# Patient Record
Sex: MALE | Race: WHITE | Employment: FULL TIME | URBAN - METROPOLITAN AREA
[De-identification: names, ages, dates, MRNs, and addresses within clinical notes are randomized per-mention and may not be internally consistent; named-entity substitution may affect disease eponyms.]

---

## 2019-01-09 PROBLEM — Z85.46 HISTORY OF PROSTATE CANCER: Status: ACTIVE | Noted: 2019-01-09

## 2019-01-09 PROBLEM — N52.8 OTHER MALE ERECTILE DYSFUNCTION: Status: ACTIVE | Noted: 2019-01-09

## 2022-08-15 ENCOUNTER — HOSPITAL ENCOUNTER (EMERGENCY)
Facility: CLINIC | Age: 72
Discharge: HOME | End: 2022-08-15
Attending: EMERGENCY MEDICINE
Payer: COMMERCIAL

## 2022-08-15 VITALS
HEIGHT: 72 IN | HEART RATE: 100 BPM | TEMPERATURE: 98.1 F | BODY MASS INDEX: 28.44 KG/M2 | OXYGEN SATURATION: 93 % | WEIGHT: 210 LBS | DIASTOLIC BLOOD PRESSURE: 77 MMHG | RESPIRATION RATE: 16 BRPM | SYSTOLIC BLOOD PRESSURE: 159 MMHG

## 2022-08-15 DIAGNOSIS — R04.0 EPISTAXIS: Primary | ICD-10-CM

## 2022-08-15 PROCEDURE — 99283 EMERGENCY DEPT VISIT LOW MDM: CPT

## 2022-08-15 RX ORDER — LISINOPRIL 10 MG/1
TABLET ORAL
COMMUNITY
Start: 2022-07-29

## 2022-08-15 RX ORDER — ASPIRIN 81 MG/1
TABLET, CHEWABLE ORAL
COMMUNITY
Start: 2022-07-29

## 2022-08-15 RX ORDER — AMIODARONE HYDROCHLORIDE 100 MG/1
200 TABLET ORAL
COMMUNITY
Start: 2022-07-29

## 2022-08-15 RX ORDER — ALLOPURINOL 100 MG/1
300 TABLET ORAL
COMMUNITY
Start: 2022-07-29

## 2022-08-15 RX ORDER — SIMVASTATIN 40 MG
TABLET ORAL
COMMUNITY
Start: 2022-07-29

## 2022-08-15 RX ORDER — OXYBUTYNIN CHLORIDE 5 MG/1
TABLET ORAL
COMMUNITY
Start: 2022-08-03

## 2022-08-15 RX ORDER — FUROSEMIDE 40 MG/1
TABLET ORAL
COMMUNITY
Start: 2022-07-29

## 2022-08-15 ASSESSMENT — ENCOUNTER SYMPTOMS
ALLERGIC/IMMUNOLOGIC NEGATIVE: 1
EYES NEGATIVE: 1
GASTROINTESTINAL NEGATIVE: 1
RESPIRATORY NEGATIVE: 1

## 2022-08-16 NOTE — DISCHARGE INSTRUCTIONS
Avoid blowing your nose when he sneezes anything open mouth, see your own doctor within 2 days for further evaluation. Use the ointment in the morning and at night make sure that you are running a humidifier in her house that you are not living in a dry environment. Return to the ER if you are worsening in any way.

## 2022-08-16 NOTE — ED PROVIDER NOTES
eMERGENCY dEPARTMENT eNCOUnter      Pt Name: James Abraham  MRN: [id number]  [place] 1950  Date of evaluation: 8/15/2022      CHIEF COMPLAINT       Chief Complaint   Patient presents with    Epistaxis          HISTORY OF PRESENT ILLNESS    James Abraham is a 66 y.o. male who presents urgency department for evaluation of epistaxis that he said was gushing a few minutes before he came to the emergency department however right now it is dry [facility] but [facility] were too busy so he came here. Right now there is no nosebleed. He had the same issue last week and they controlled him with packing and and pressure. Patient is mildly hypertensive here today. REVIEW OF SYSTEMS       Review of Systems   Constitutional: Negative. HENT:  Positive for nosebleeds. Eyes: Negative. Respiratory: Negative. Cardiovascular: Negative. Gastrointestinal: Negative. Endocrine: Negative. Musculoskeletal: Negative. Skin: Negative. Allergic/Immunologic: Negative. Neurological: Negative. Hematological: Negative. Psychiatric/Behavioral: Negative. PAST MEDICAL HISTORY    has a past medical history of BPH with obstruction/lower urinary tract symptoms, Caffeine use, ED (erectile dysfunction), Elevated prostate specific antigen (PSA), Hematuria, Hyperlipidemia, Hypertension, Kidney stones, Kidney stones, and Prostate cancer ([facility] [address] SURGICAL HISTORY      has a past surgical history that includes Prostate surgery (01/15/14); Appendectomy; joint replacement (02/2012); Tonsillectomy and adenoidectomy; and Prostate Biopsy. CURRENT MEDICATIONS       Previous Medications    ALLOPURINOL (ZYLOPRIM) 100 MG TABLET    300 mg    ALLOPURINOL (ZYLOPRIM) 300 MG TABLET    TAKE 1 TABLET BY MOUTH ONCE DAILY    ALLOPURINOL (ZYLOPRIM) 300 MG TABLET    allopurinol 300 mg tablet   Take 1 tablet every day by oral route for 90 days.     AMIODARONE (PACERONE) 100 MG TABLET    200 mg    AMLODIPINE (NORVASC) 5 MG TABLET        ASPIRIN 81 MG CHEWABLE TABLET    1 tablet DAILY (route: oral)    CHLORTHALIDONE (HYGROTON) 25 MG TABLET        CHOLECALCIFEROL (VITAMIN D3) 2000 UNITS CAPS    Take by mouth    FUROSEMIDE (LASIX) 40 MG TABLET    2 tablet DAILY (route: oral)    HYDROCHLOROTHIAZIDE (HYDRODIURIL) 12.5 MG TABLET    take 1 tablet by mouth once daily    LISINOPRIL (PRINIVIL;ZESTRIL) 10 MG TABLET    1 tablet DAILY (route: oral)    LISINOPRIL (PRINIVIL;ZESTRIL) 20 MG TABLET        METOPROLOL (TOPROL-XL) 100 MG XL TABLET        OXYBUTYNIN (DITROPAN) 5 MG TABLET    1 tablet 2 TIMES DAILY (route: oral)    SILDENAFIL (REVATIO) 20 MG TABLET    Take 1 tablet by mouth as needed (ED) Take 1-5 tablets 1/2 hour prior to sexual intercourse prn ED    SIMVASTATIN (ZOCOR) 40 MG TABLET        SIMVASTATIN (ZOCOR) 40 MG TABLET    1 tablet DAILY (route: oral)    SPIRONOLACTONE (ALDACTONE) 25 MG TABLET           ALLERGIES     has No Known Allergies. FAMILY HISTORY     has no family status information on file. family history is not on file. SOCIAL HISTORY      reports that he has quit smoking. He has never used smokeless tobacco. He reports current alcohol use of about 1.7 standard drinks per week. He reports that he does not use drugs. PHYSICAL EXAM     INITIAL VITALS:  height is 6' (1.829 m) and weight is 95.3 kg (210 lb). His oral temperature is 98.1 °F (36.7 °C). His blood pressure is 159/77 (abnormal) and his pulse is 100. His respiration is 16 and oxygen saturation is 93%.        Constitutional: Alert, oriented x3, nontoxic, afebrile, answering questions appropriately, acting properly for age, in no acute distress  HEENT: Extraocular muscles intact, mucus membranes moist, TMs clear bilaterally, no posterior pharyngeal erythema or exudates, Pupils equal, round, reactive to light,   Neck: Trachea midline, Supple without lymphadenopathy, no posterior midline neck tenderness to palpation  Cardiovascular: Regular rhythm and rate no S3, S4, or murmurs  Respiratory: Clear to auscultation bilaterally no wheezes, rhonchi, rales, no respiratory distress  Gastrointestinal: Soft, nontender, nondistended, positive bowel sounds. No rebound, rigidity, or guarding. Musculoskeletal: No extremity pain or swelling  Neurologic: Moving all 4 extremities without difficulty there are no gross focal neurologic deficits  Skin: Warm and dry    DIFFERENTIAL DIAGNOSIS/ MDM:     Nosebleed resolved, patient will be discharged home with Bactroban ointment and follow-up with his PCP    DIAGNOSTIC RESULTS     EKG: All EKG's are interpreted by the Emergency Department Physician who either signs or Co-signs this chart in the absence of a cardiologist.        Not indicated unless otherwise documented above    LABS:  No results found for this visit on 08/15/22. Not indicated unless otherwise documented above    RADIOLOGY:   I reviewed the radiologist interpretations:  No orders to display       Not indicated unless otherwise documented above    EMERGENCY DEPARTMENT COURSE:     The patient was given the following medications:  Orders Placed This Encounter   Medications    mupirocin (BACTROBAN NASAL) 2 % nasal ointment     Sig: by Nasal route 2 times daily for 7 days Take by Nasal route 2 times daily. Dispense:  1 g     Refill:  0        Vitals:    Vitals:    08/15/22 2108   BP: (!) 159/77   Pulse: 100   Resp: 16   Temp: 98.1 °F (36.7 °C)   TempSrc: Oral   SpO2: 93%   Weight: 95.3 kg (210 lb)   Height: 6' (1.829 m)     -------------------------  BP (!) 159/77   Pulse 100   Temp 98.1 °F (36.7 °C) (Oral)   Resp 16   Ht 6' (1.829 m)   Wt 95.3 kg (210 lb)   SpO2 93%   BMI 28.48 kg/m²         I have reviewed the disposition diagnosis with the patient and or their family/guardian. I have answered their questions and given discharge instructions.  They voiced understanding of these instructions and did not have any further questions or complaints. CRITICAL CARE:    None    CONSULTS:    None    PROCEDURES:    None      OARRS Report if indicated             FINAL IMPRESSION      1. Epistaxis          DISPOSITION/PLAN   DISPOSITION Decision To Discharge    I have reviewed the disposition diagnosis with the patient and or their family/guardian. I have answered their questions and given discharge instructions. They voiced understanding of these instructions and did not have any further questions or complaints. Reevaluation: Patient's nosebleed is completely resolved and his nares completely dry do not feel there is anything we need to do here in the emergency department for this we will go ahead and prescribe Bactroban for him have him use humidified air and follow-up with his doctor for referral to ENT if necessary. PATIENT REFERRED TO:  Obed Whatley MD  [address] [place] [address]  [phone]    In 2 days      DISCHARGE MEDICATIONS:  New Prescriptions    MUPIROCIN (BACTROBAN NASAL) 2 % NASAL OINTMENT    by Nasal route 2 times daily for 7 days Take by Nasal route 2 times daily.        (Please note that portions of this note were completed with a voice recognition program.  Efforts were made to edit the dictations but occasionally words are mis-transcribed.)    Latosha Horan MD,, MD  Attending Emergency Physician            Latosha Horan MD  08/15/22 8455

## 2022-08-16 NOTE — ED NOTES
Patient to ED via self and family to room 8  Here for complaint of epistaxis   Patient states his nose started to bleed 30 minutes PTA.  Patient initially went to [facility] but states they were too busy so he came here and between that time and now his nose bleed stopped  Patient states he had the same issue last week and was seen at [facility] where they controlled his bleeding with pressure, gauze, and re-evaluation, and then sent the patient home  Patient denies any other complaints including CP, SOB, or N/V    Vitals obtained and call light provided  Patient resting comfortably on stretcher in no apparent distress  Respirations even and non-labored  Awaiting provider evaluation at this time     Unruly Kulkarni, CHIKA  08/15/22 1245

## 2022-08-22 ENCOUNTER — HOSPITAL ENCOUNTER (EMERGENCY)
Facility: CLINIC | Age: 72
Discharge: HOME | End: 2022-08-22
Attending: EMERGENCY MEDICINE
Payer: COMMERCIAL

## 2022-08-22 VITALS
BODY MASS INDEX: 28.44 KG/M2 | HEART RATE: 94 BPM | OXYGEN SATURATION: 94 % | HEIGHT: 72 IN | WEIGHT: 210 LBS | DIASTOLIC BLOOD PRESSURE: 88 MMHG | TEMPERATURE: 98.1 F | RESPIRATION RATE: 16 BRPM | SYSTOLIC BLOOD PRESSURE: 190 MMHG

## 2022-08-22 DIAGNOSIS — R04.0 EPISTAXIS: Primary | ICD-10-CM

## 2022-08-22 PROCEDURE — 99282 EMERGENCY DEPT VISIT SF MDM: CPT

## 2022-08-22 ASSESSMENT — PAIN - FUNCTIONAL ASSESSMENT: PAIN_FUNCTIONAL_ASSESSMENT: NONE - DENIES PAIN

## 2022-08-22 NOTE — ED PROVIDER NOTES
[address] [facility]      Pt Name: Ras Salcedo  MRN: [id number]  [place] 1950  Date of evaluation: 8/22/2022      CHIEF COMPLAINT       Chief Complaint   Patient presents with    Epistaxis         HISTORY OF PRESENT ILLNESS      The patient presents with epistaxis. Patient states that this is the third time past couple weeks he has had epistaxis. The first time, he went to [facility].  At that time bleeding stopped after he had instillation of some chemical into his nose. He started having bleeding again on the 15th of this month. He came here. They did not have to pack it as it was not actively bleeding. Today he was sitting and it spontaneously started bleeding again. He thinks the bleeding is coming from the left naris. He was prescribed some ointment to put in his nose at his last visit here. He is having hypertension today but did not have a headache or mental status change. He denies dizziness or blurred vision. He denies focal deficit. The patient takes Eliquis because of heart valve replacement. He went to an orthopedic appointment today and was not having any issues. Upon arrival the patient has a nasal clamp in place. REVIEW OF SYSTEMS       All systems reviewed and negative unless noted in HPI. The patient denies fever or constitutional symptoms. Denies vision change. Denies any sore throat or rhinorrhea. Epistaxis as noted in HPI. Denies any neck pain or stiffness. Denies chest pain or shortness of breath. History of valve replacement. No nausea,  vomiting or diarrhea. Denies any dysuria. Denies urinary frequency or hematuria. History of left hip surgery. Denies any weakness, numbness or focal neurologic deficit. Denies any skin rash or edema. No recent psychiatric issues. Takes Eliquis. Denies any polyuria, polydypsia or history of immunocompromise.        PAST MEDICAL HISTORY    has a past medical history of BPH with obstruction/lower urinary tract symptoms, Caffeine use, ED (erectile dysfunction), Elevated prostate specific antigen (PSA), Hematuria, Hyperlipidemia, Hypertension, Kidney stones, Kidney stones, and Prostate cancer ([facility] [address] SURGICAL HISTORY      has a past surgical history that includes Prostate surgery (01/15/14); Appendectomy; joint replacement (02/2012); Tonsillectomy and adenoidectomy; and Prostate Biopsy. CURRENT MEDICATIONS       Previous Medications    ALLOPURINOL (ZYLOPRIM) 100 MG TABLET    300 mg    ALLOPURINOL (ZYLOPRIM) 300 MG TABLET    TAKE 1 TABLET BY MOUTH ONCE DAILY    ALLOPURINOL (ZYLOPRIM) 300 MG TABLET    allopurinol 300 mg tablet   Take 1 tablet every day by oral route for 90 days. AMIODARONE (PACERONE) 100 MG TABLET    200 mg    AMLODIPINE (NORVASC) 5 MG TABLET        APIXABAN (ELIQUIS) 5 MG TABS TABLET    Take 5 mg by mouth 2 times daily    ASPIRIN 81 MG CHEWABLE TABLET    1 tablet DAILY (route: oral)    CHLORTHALIDONE (HYGROTON) 25 MG TABLET        CHOLECALCIFEROL (VITAMIN D3) 2000 UNITS CAPS    Take by mouth    FUROSEMIDE (LASIX) 40 MG TABLET    2 tablet DAILY (route: oral)    HYDROCHLOROTHIAZIDE (HYDRODIURIL) 12.5 MG TABLET    take 1 tablet by mouth once daily    LISINOPRIL (PRINIVIL;ZESTRIL) 10 MG TABLET    1 tablet DAILY (route: oral)    LISINOPRIL (PRINIVIL;ZESTRIL) 20 MG TABLET        METOPROLOL (TOPROL-XL) 100 MG XL TABLET        MUPIROCIN (BACTROBAN NASAL) 2 % NASAL OINTMENT    by Nasal route 2 times daily for 7 days Take by Nasal route 2 times daily.     OXYBUTYNIN (DITROPAN) 5 MG TABLET    1 tablet 2 TIMES DAILY (route: oral)    SILDENAFIL (REVATIO) 20 MG TABLET    Take 1 tablet by mouth as needed (ED) Take 1-5 tablets 1/2 hour prior to sexual intercourse prn ED    SIMVASTATIN (ZOCOR) 40 MG TABLET        SIMVASTATIN (ZOCOR) 40 MG TABLET    1 tablet DAILY (route: oral)    SPIRONOLACTONE (ALDACTONE) 25 MG TABLET           ALLERGIES     has No Known Allergies. FAMILY HISTORY     has no family status information on file. family history is not on file. SOCIAL HISTORY      reports that he has quit smoking. He has never used smokeless tobacco. He reports current alcohol use of about 1.7 standard drinks per week. He reports that he does not use drugs. PHYSICAL EXAM     INITIAL VITALS:  height is 6' (1.829 m) and weight is 95.3 kg (210 lb). His oral temperature is 98.1 °F (36.7 °C). His blood pressure is 190/88 (abnormal) and his pulse is 94. His respiration is 16 and oxygen saturation is 94%. The patient is alert and oriented, in no apparent distress. HEENT is atraumatic. Pupils are PERRL at 4 mm with normal extraocular motion. Mucous membranes moist.  No active bleeding. Examination of nares demonstrates no site of bleeding source. Neck is supple. No meningismus. Heart sounds regular rate and rhythm with grade 3 systolic murmur noted. Lungs clear, no wheezes, rales or rhonchi. Abdomen: soft, nontender with no pain to palpation. Musculoskeletal exam shows no evidence of trauma. Normal distal pulses in all extremities. Skin: no rash or edema. Neurological exam reveals cranial nerves 2 through 12 grossly intact. Patient has equal  and normal deep tendon reflexes. Psychiatric: no hallucinations or suicidal ideation. Lymphatics.:  No lymphadenopathy. DIFFERENTIAL DIAGNOSIS/ MDM:     Epistaxis, hypertension    DIAGNOSTIC RESULTS           EMERGENCY DEPARTMENT COURSE:   Vitals:    Vitals:    08/22/22 1426   BP: (!) 190/88   Pulse: 94   Resp: 16   Temp: 98.1 °F (36.7 °C)   TempSrc: Oral   SpO2: 94%   Weight: 95.3 kg (210 lb)   Height: 6' (1.829 m)     -------------------------  BP: (!) 190/88, Temp: 98.1 °F (36.7 °C), Heart Rate: 94, Resp: 16      Re-evaluation Notes    I did not need to pack the patient's nose as his bleeding stopped spontaneously.   He was on the phone with his doctor and they have been discussing his blood thinner medications. They can decide what medicines to continue. In the meantime, I will refer the patient to the ENT for follow-up. I advised that he stay hydrated and may use either mineral oil, olive oil, or saline nasal spray in his nose. He is advised to return if he has worsening bleeding. He is discharged in good condition.       FINAL IMPRESSION      1. Epistaxis          DISPOSITION/PLAN   DISPOSITION Decision To Discharge 08/22/2022 03:01:17 PM      Condition on Disposition    good    PATIENT REFERRED TO:  Lisa Burkett MD  [place] [address]  [phone]    In 2 days      DISCHARGE MEDICATIONS:  New Prescriptions    No medications on file       (Please note that portions of this note were completed with a voice recognition program.  Efforts were made to edit the dictations but occasionally words are mis-transcribed.)    Alina Wagner MD,, MD   Attending Emergency Physician       George Sow MD  08/22/22 8683

## 2022-08-22 NOTE — DISCHARGE INSTRUCTIONS
Continue medications as directed. Talk to your doctor about your blood thinner pressure medicine. If bleeding occurs, pinch nose closed for 20 minutes. If it continues to bleed, seek medical attention. Return for bleeding, feeling faint, dizziness, or if worse in any way. Please understand that at this time there is no evidence for a more serious underlying process, but that early in the process of an illness or injury, an emergency department workup can be falsely reassuring. You should contact your family doctor within the next 48 hours for a follow up appointment    Erika Villeda!!!    From [facility] ([facility]) and [facility]    On behalf of the Emergency Department staff at [facility]), I would like to thank you for giving us the opportunity to address your health care needs and concerns. We hope that during your visit, our service was delivered in a professional and caring manner. Please keep [facility] ([facility]) in mind as we walk with you down the path to your own personal wellness. Please expect an automated text message or email from us so we can ask a few questions about your health and progress. Based on your answers, a clinician may call you back to offer help and instructions. Please understand that early in the process of an illness or injury, an emergency department workup can be falsely reassuring. If you notice any worsening, changing or persistent symptoms please call your family doctor or return to the ER immediately. Tell us how we did during your visit at [email or web address]   and let us know about your experience

## 2022-08-22 NOTE — ED NOTES
Patient to ED with wife to room 7  Here for complaint of nose bleed  Patient has been seen here and [facility] for the same complaint over the last month or so  Patient states these nose bleeds have been coming and going sporadically and usually the bleeding stops  Patient states at [facility] they almost have him a blood transfusion  States he takes eliquis for heart related issues  Has not seen an ENT  Denies any other complaint including CP, SOB, or N/V    Vitals obtained and call light provided  Patient resting comfortably on stretcher in no apparent distress  Respirations even and non-labored  Dr. Fairchild  at bedside to evaluate patient     Alla Toribio RN  08/22/22 4911

## 2023-07-26 ENCOUNTER — HOSPITAL ENCOUNTER (OUTPATIENT)
Dept: GENERAL RADIOLOGY | Facility: CLINIC | Age: 73
Discharge: HOME | End: 2023-07-28
Payer: COMMERCIAL

## 2023-07-26 DIAGNOSIS — R09.89 RESPIRATORY CRACKLES: ICD-10-CM

## 2023-07-26 PROCEDURE — 71046 X-RAY EXAM CHEST 2 VIEWS: CPT
